# Patient Record
Sex: MALE | Race: WHITE | URBAN - METROPOLITAN AREA
[De-identification: names, ages, dates, MRNs, and addresses within clinical notes are randomized per-mention and may not be internally consistent; named-entity substitution may affect disease eponyms.]

---

## 2021-01-09 PROBLEM — F51.01 PRIMARY INSOMNIA: Status: ACTIVE | Noted: 2019-06-21

## 2021-01-09 PROBLEM — G47.33 OSA (OBSTRUCTIVE SLEEP APNEA): Status: ACTIVE | Noted: 2019-07-09

## 2021-01-09 PROBLEM — J30.1 SEASONAL ALLERGIC RHINITIS DUE TO POLLEN: Status: ACTIVE | Noted: 2019-06-21

## 2023-10-02 ASSESSMENT — ENCOUNTER SYMPTOMS
COLOR CHANGE: 0
SHORTNESS OF BREATH: 0
VOMITING: 0
WHEEZING: 0
RHINORRHEA: 0
COUGH: 0
ABDOMINAL PAIN: 0
DIARRHEA: 0
CONSTIPATION: 0
NAUSEA: 0
CHEST TIGHTNESS: 0
BACK PAIN: 0

## 2023-10-03 ENCOUNTER — OFFICE VISIT (OUTPATIENT)
Dept: FAMILY MEDICINE CLINIC | Age: 48
End: 2023-10-03
Payer: COMMERCIAL

## 2023-10-03 VITALS
SYSTOLIC BLOOD PRESSURE: 124 MMHG | DIASTOLIC BLOOD PRESSURE: 72 MMHG | HEART RATE: 74 BPM | BODY MASS INDEX: 27.96 KG/M2 | WEIGHT: 211 LBS | OXYGEN SATURATION: 98 % | HEIGHT: 73 IN

## 2023-10-03 DIAGNOSIS — Z00.00 ANNUAL PHYSICAL EXAM: Primary | ICD-10-CM

## 2023-10-03 DIAGNOSIS — Z76.89 ENCOUNTER TO ESTABLISH CARE: ICD-10-CM

## 2023-10-03 DIAGNOSIS — Z12.11 SCREENING FOR COLON CANCER: ICD-10-CM

## 2023-10-03 DIAGNOSIS — R31.9 HEMATURIA, UNSPECIFIED TYPE: ICD-10-CM

## 2023-10-03 LAB
BILIRUBIN, POC: NORMAL
BLOOD URINE, POC: NORMAL
CLARITY, POC: NORMAL
COLOR, POC: YELLOW
GLUCOSE URINE, POC: NORMAL
KETONES, POC: NORMAL
LEUKOCYTE EST, POC: NORMAL
NITRITE, POC: NORMAL
PH, POC: 6
PROTEIN, POC: NORMAL
SPECIFIC GRAVITY, POC: 1.02
UROBILINOGEN, POC: 0.2

## 2023-10-03 PROCEDURE — 99386 PREV VISIT NEW AGE 40-64: CPT | Performed by: CLINICAL NURSE SPECIALIST

## 2023-10-03 PROCEDURE — 81002 URINALYSIS NONAUTO W/O SCOPE: CPT | Performed by: CLINICAL NURSE SPECIALIST

## 2023-10-03 ASSESSMENT — PATIENT HEALTH QUESTIONNAIRE - PHQ9
SUM OF ALL RESPONSES TO PHQ QUESTIONS 1-9: 0
SUM OF ALL RESPONSES TO PHQ9 QUESTIONS 1 & 2: 0
2. FEELING DOWN, DEPRESSED OR HOPELESS: 0
SUM OF ALL RESPONSES TO PHQ QUESTIONS 1-9: 0
SUM OF ALL RESPONSES TO PHQ QUESTIONS 1-9: 0
1. LITTLE INTEREST OR PLEASURE IN DOING THINGS: 0
SUM OF ALL RESPONSES TO PHQ QUESTIONS 1-9: 0

## 2023-10-03 NOTE — PROGRESS NOTES
Discharge Summary/Instructions after an Endoscopic Procedure  Patient Name: Alon Adamson  Patient MRN: [id number]  Patient YOB: 1954  Thursday, January 25, 2018  Anju Myles MD  RESTRICTIONS:  During your procedure today, you received medications for sedation.  These   medications may affect your judgment, balance and coordination.  Therefore,   for 24 hours, you have the following restrictions:   - DO NOT drive a car, operate machinery, make legal/financial decisions,   sign important papers or drink alcohol.    ACTIVITY:  The following day: return to full activity including work, except no heavy   lifting, straining or running for 3 days if polyps were removed.  DIET:  Eat and drink normally unless instructed otherwise.     TREATMENT FOR COMMON SIDE EFFECTS:  - Mild abdominal pain, belching, bloating or excessive gas: rest, eat   lightly and use a heating pad.  - Sore Throat: treat with throat lozenges and/or gargle with warm salt   water.  SYMPTOMS TO WATCH FOR AND REPORT TO YOUR PHYSICIAN:  1. Abdominal pain or bloating, other than gas cramps.  2. Chest pain.  3. Back pain.  4. Chills or fever occurring within 24 hours after the procedure.  5. Rectal bleeding, which would show as bright red, maroon, or black stools.   (A tablespoon of blood from the rectum is not serious, especially if   hemorrhoids are present.)  6. Vomiting.  7. Weakness or dizziness.  8. Because air was used during the procedure, expelling large amounts of air   from your rectum or belching is normal.  9. If a bowel prep was taken, you may not have a bowel movement for 1-3   days.  This is normal.  GO DIRECTLY TO THE NEAREST EMERGENCY ROOM IF YOU HAVE ANY OF THE FOLLOWING:      Difficulty breathing  Chills and/or fever over 101 F   Persistent vomiting and/or vomiting blood   Severe abdominal pain   Severe chest pain   Black, tarry stools   Bleeding- more than one tablespoon   Any other symptom or condition that you may feel  SUBJECTIVE:    Patient ID:  Mary Martinez is a 57 y.o. male      Patient is here to establish care and for a complete physical.  He has no questions or concerns regarding their health. Patient's allergies, medication, medical, surgical, family and social history were reviewed and updated accordingly. He was recently seen by dermatologist and had a spot removed, which was noncancerous. He is also followed by GI for esophageal strictures and and had esophageal dilation about 3 years ago. He also has a history of central apnea and uses a CPAP machine nightly. 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 Discussed colon cancer screening, referral given to gastroenterology. Reviewed from 9/19/2023 CBC unremarkable, CMP essentially, total cholesterol 148, triglycerides 93, HDL 45, LDL 86, A1c 5.2. States he had his flu vaccine earlier this week at work. Current Outpatient Medications on File Prior to Visit   Medication Sig Dispense Refill    levocetirizine (XYZAL) 5 MG tablet Take 1 tablet by mouth nightly. 30 tablet 1     No current facility-administered medications on file prior to visit. Past Medical History:   Diagnosis Date    Seasonal allergies      History reviewed. No pertinent surgical history.   Family History   Problem Relation Age of Onset    High Cholesterol Father     Heart Attack Father     Heart Disease Father     Pancreatic Cancer Maternal Grandmother     Bone Cancer Maternal Grandfather     Amyotrophic lateral sclerosis Paternal Grandmother      Social History     Socioeconomic History    Marital status: Single     Spouse name: Not on file    Number needs urgent attention  Your doctor recommends these additional instructions:  If any biopsies were taken, your doctor s clinic will contact you in 1 to 2   weeks with any results.  Resume your previous diet.   Continue your present medications.   Return to your liver clinic as previously scheduled.   The findings and recommendations have been discussed with you.  For questions, problems or results please call your physician - Anju Myles MD at Work:  [phone].  [facility], EMERGENCY ROOM PHONE NUMBER: [phone]  IF A COMPLICATION OR EMERGENCY SITUATION ARISES AND YOU ARE UNABLE TO REACH   YOUR PHYSICIAN - GO DIRECTLY TO THE EMERGENCY ROOM.  Anju Myles MD  1/25/2018 12:39:08 PM  This report has been verified and signed electronically.

## 2023-10-03 NOTE — PATIENT INSTRUCTIONS
Signed appropriate paperwork to have records sent to the office    Fasting labs ordered    Discussed colon cancer screening, referral to gastroenterology given    Follow-up as needed/directed for acute issues/chronic conditions

## 2023-10-05 LAB — BACTERIA UR CULT: NORMAL

## 2024-02-20 ENCOUNTER — COMMUNITY OUTREACH (OUTPATIENT)
Dept: FAMILY MEDICINE CLINIC | Age: 49
End: 2024-02-20

## 2024-12-16 ENCOUNTER — OFFICE VISIT (OUTPATIENT)
Dept: FAMILY MEDICINE CLINIC | Age: 49
End: 2024-12-16

## 2024-12-16 ENCOUNTER — HOSPITAL ENCOUNTER (OUTPATIENT)
Dept: CT IMAGING | Age: 49
Discharge: HOME | End: 2024-12-16
Payer: COMMERCIAL

## 2024-12-16 VITALS
OXYGEN SATURATION: 99 % | BODY MASS INDEX: 27.3 KG/M2 | SYSTOLIC BLOOD PRESSURE: 112 MMHG | HEIGHT: 73 IN | HEART RATE: 85 BPM | WEIGHT: 206 LBS | DIASTOLIC BLOOD PRESSURE: 68 MMHG

## 2024-12-16 DIAGNOSIS — R51.9 NEW ONSET HEADACHE: ICD-10-CM

## 2024-12-16 DIAGNOSIS — R51.9 WORST HEADACHE OF LIFE: Primary | ICD-10-CM

## 2024-12-16 DIAGNOSIS — G44.82 HEADACHE ASSOCIATED WITH ORGASM: ICD-10-CM

## 2024-12-16 DIAGNOSIS — R31.9 HEMATURIA, UNSPECIFIED TYPE: ICD-10-CM

## 2024-12-16 DIAGNOSIS — Z13.220 SCREENING FOR CHOLESTEROL LEVEL: ICD-10-CM

## 2024-12-16 DIAGNOSIS — Z12.11 SCREENING FOR COLON CANCER: ICD-10-CM

## 2024-12-16 DIAGNOSIS — J02.9 SORE THROAT: ICD-10-CM

## 2024-12-16 DIAGNOSIS — R51.9 WORST HEADACHE OF LIFE: ICD-10-CM

## 2024-12-16 DIAGNOSIS — Z20.818 EXPOSURE TO STREP THROAT: ICD-10-CM

## 2024-12-16 DIAGNOSIS — J06.9 ACUTE URI: ICD-10-CM

## 2024-12-16 LAB
BILIRUBIN, POC: NORMAL
BLOOD URINE, POC: NORMAL
CLARITY, POC: CLEAR
COLOR, POC: YELLOW
GLUCOSE URINE, POC: NORMAL MG/DL
INFLUENZA A ANTIBODY: NORMAL
INFLUENZA B ANTIBODY: NORMAL
KETONES, POC: 15 MG/DL
LEUKOCYTE EST, POC: NORMAL
NITRITE, POC: NORMAL
PH, POC: 6
PROTEIN, POC: 30 MG/DL
S PYO AG THROAT QL: NORMAL
SPECIFIC GRAVITY, POC: 1.02
UROBILINOGEN, POC: 1 MG/DL

## 2024-12-16 PROCEDURE — 70450 CT HEAD/BRAIN W/O DYE: CPT

## 2024-12-16 SDOH — ECONOMIC STABILITY: FOOD INSECURITY: WITHIN THE PAST 12 MONTHS, THE FOOD YOU BOUGHT JUST DIDN'T LAST AND YOU DIDN'T HAVE MONEY TO GET MORE.: NEVER TRUE

## 2024-12-16 SDOH — ECONOMIC STABILITY: INCOME INSECURITY: HOW HARD IS IT FOR YOU TO PAY FOR THE VERY BASICS LIKE FOOD, HOUSING, MEDICAL CARE, AND HEATING?: NOT HARD AT ALL

## 2024-12-16 SDOH — ECONOMIC STABILITY: FOOD INSECURITY: WITHIN THE PAST 12 MONTHS, YOU WORRIED THAT YOUR FOOD WOULD RUN OUT BEFORE YOU GOT MONEY TO BUY MORE.: NEVER TRUE

## 2024-12-16 ASSESSMENT — PATIENT HEALTH QUESTIONNAIRE - PHQ9
SUM OF ALL RESPONSES TO PHQ9 QUESTIONS 1 & 2: 0
2. FEELING DOWN, DEPRESSED OR HOPELESS: NOT AT ALL
SUM OF ALL RESPONSES TO PHQ QUESTIONS 1-9: 0
1. LITTLE INTEREST OR PLEASURE IN DOING THINGS: NOT AT ALL
SUM OF ALL RESPONSES TO PHQ QUESTIONS 1-9: 0
SUM OF ALL RESPONSES TO PHQ QUESTIONS 1-9: 0
SUM OF ALL RESPONSES TO PHQ9 QUESTIONS 1 & 2: 0
SUM OF ALL RESPONSES TO PHQ QUESTIONS 1-9: 0
1. LITTLE INTEREST OR PLEASURE IN DOING THINGS: NOT AT ALL
2. FEELING DOWN, DEPRESSED OR HOPELESS: NOT AT ALL

## 2024-12-16 NOTE — PROGRESS NOTES
SUBJECTIVE:    Patient ID:  Clyde Alexis is a 49 y.o. male      Patient was originally scheduled for a complete physical.  He is concerned about URI symptoms, sore throat and headache associated with sexual activity.  Explained to patient that acute issues URI symptoms/sore throat and headache will be addressed today and he can reschedule for his complete physical.  Patient verbalizes understanding and is agreeable to treatment plan.      States his wife was recently diagnosed with strep and he has been experiencing a sore throat.  He also states he has been having headaches with sex.  States he has never had headaches like this and endorses this is the worst headache he has had.  States he has done some research online and is wanting to try propranolol.      Illness  Episode onset: 4 days. The problem occurs constantly. The problem has been gradually improving since onset. The pain is moderate. Associated symptoms include congestion, headaches, a sore throat, fatigue, a fever and coughing (occ prod). Pertinent negatives include no ear pain, eye discharge, rhinorrhea, swollen glands, chest pain, shortness of breath, wheezing, abdominal pain, constipation, diarrhea, nausea, vomiting, joint pain, muscle aches or rash. Treatments tried: Ibuprofen, cough drops. The treatment provided mild relief. The fever has been present for 1 to 2 days. Temperature source: IR. Cough associated with: occ productive. Cough characteristics: occ productive. There is Nasal congestion. The congestion Does not interfere with sleep. The congestion Does not interfere with eating or drinking. He has been experiencing a moderate sore throat.   Headache  Headache pattern:  Headache sometimes there, sometimes not at all  Initial event:  None  Recent changes:  No recent change in headache pattern  Frequency:  More than 3 per week  Providers seen:  None  Age of onset (years):  49  Longest time without a headache:  Minutes  Number of ER visits for

## 2024-12-16 NOTE — PATIENT INSTRUCTIONS
Discussed symptomatic treatment of URI symptoms    Strep and influenza were negative     Flonase 1-2 puffs to each nostril daily.    Delsym twice a day as needed for cough.    Cepacol Lozenges as needed for sore throat.      Tylenol and or Motrin as needed/directed for pain and fever.      Encourage rest and fluids.        CT of the head STAT    Ibuprofen 600 mg to 800 mg 2-3 times a day as needed for headache, take with food and full glass of water (after CT)     Keep a headache journal including, timing, associated symptoms, treatments and response to treatment    Trial of propranolol inderal 20 mg twice daily, if CT normal (orgasm related headache)     Discussed proper hydration and sleep for headache prevent

## 2024-12-17 RX ORDER — PROPRANOLOL HCL 20 MG
20 TABLET ORAL 2 TIMES DAILY
Qty: 60 TABLET | Refills: 0 | Status: SHIPPED | OUTPATIENT
Start: 2024-12-17

## 2024-12-18 LAB — BACTERIA UR CULT: NORMAL

## 2025-01-13 ENCOUNTER — OFFICE VISIT (OUTPATIENT)
Dept: FAMILY MEDICINE CLINIC | Age: 50
End: 2025-01-13

## 2025-01-13 VITALS
HEIGHT: 73 IN | OXYGEN SATURATION: 97 % | SYSTOLIC BLOOD PRESSURE: 128 MMHG | BODY MASS INDEX: 27.57 KG/M2 | HEART RATE: 76 BPM | DIASTOLIC BLOOD PRESSURE: 74 MMHG | WEIGHT: 208 LBS

## 2025-01-13 DIAGNOSIS — R51.9 NEW ONSET HEADACHE: ICD-10-CM

## 2025-01-13 DIAGNOSIS — G44.82 HEADACHE ASSOCIATED WITH ORGASM: ICD-10-CM

## 2025-01-13 DIAGNOSIS — R13.10 DYSPHAGIA, UNSPECIFIED TYPE: ICD-10-CM

## 2025-01-13 DIAGNOSIS — G44.82 HEADACHE ASSOCIATED WITH ORGASM: Primary | ICD-10-CM

## 2025-01-13 RX ORDER — PROPRANOLOL HCL 20 MG
20 TABLET ORAL 2 TIMES DAILY
Qty: 60 TABLET | Refills: 0 | OUTPATIENT
Start: 2025-01-13

## 2025-01-13 RX ORDER — PROPRANOLOL HCL 20 MG
20 TABLET ORAL 2 TIMES DAILY
Qty: 180 TABLET | Refills: 0 | Status: SHIPPED | OUTPATIENT
Start: 2025-01-13

## 2025-01-13 SDOH — ECONOMIC STABILITY: FOOD INSECURITY: WITHIN THE PAST 12 MONTHS, THE FOOD YOU BOUGHT JUST DIDN'T LAST AND YOU DIDN'T HAVE MONEY TO GET MORE.: NEVER TRUE

## 2025-01-13 SDOH — ECONOMIC STABILITY: INCOME INSECURITY: IN THE LAST 12 MONTHS, WAS THERE A TIME WHEN YOU WERE NOT ABLE TO PAY THE MORTGAGE OR RENT ON TIME?: NO

## 2025-01-13 SDOH — ECONOMIC STABILITY: FOOD INSECURITY: WITHIN THE PAST 12 MONTHS, YOU WORRIED THAT YOUR FOOD WOULD RUN OUT BEFORE YOU GOT MONEY TO BUY MORE.: NEVER TRUE

## 2025-01-13 SDOH — ECONOMIC STABILITY: TRANSPORTATION INSECURITY
IN THE PAST 12 MONTHS, HAS LACK OF TRANSPORTATION KEPT YOU FROM MEETINGS, WORK, OR FROM GETTING THINGS NEEDED FOR DAILY LIVING?: NO

## 2025-01-13 SDOH — ECONOMIC STABILITY: TRANSPORTATION INSECURITY
IN THE PAST 12 MONTHS, HAS THE LACK OF TRANSPORTATION KEPT YOU FROM MEDICAL APPOINTMENTS OR FROM GETTING MEDICATIONS?: NO

## 2025-01-13 ASSESSMENT — PATIENT HEALTH QUESTIONNAIRE - PHQ9
SUM OF ALL RESPONSES TO PHQ QUESTIONS 1-9: 0
SUM OF ALL RESPONSES TO PHQ QUESTIONS 1-9: 0
2. FEELING DOWN, DEPRESSED OR HOPELESS: NOT AT ALL
SUM OF ALL RESPONSES TO PHQ QUESTIONS 1-9: 0
2. FEELING DOWN, DEPRESSED OR HOPELESS: NOT AT ALL
SUM OF ALL RESPONSES TO PHQ9 QUESTIONS 1 & 2: 0
1. LITTLE INTEREST OR PLEASURE IN DOING THINGS: NOT AT ALL
1. LITTLE INTEREST OR PLEASURE IN DOING THINGS: NOT AT ALL
SUM OF ALL RESPONSES TO PHQ QUESTIONS 1-9: 0
SUM OF ALL RESPONSES TO PHQ9 QUESTIONS 1 & 2: 0

## 2025-01-13 NOTE — PROGRESS NOTES
SUBJECTIVE:    Patient ID:  Clyde Alexis is a 49 y.o. male      Patient is here for a 1 month follow up for new onset headache and headache with orgasm.  He was started on propanolol 20 mg twice daily.      Patient was originally scheduled for a complete physical.  He is concerned about URI symptoms, sore throat and headache associated with sexual activity.  Explained to patient that acute issues URI symptoms/sore throat and headache will be addressed today and he can reschedule for his complete physical.  Patient verbalizes understanding and is agreeable to treatment plan.       States his wife was recently diagnosed with strep and he has been experiencing a sore throat.  He also states he has been having headaches with sex.  States he has never had headaches like this and endorses this is the worst headache he has had.  States he has done some research online and is wanting to try propranolol.      States heads have resolved with propanolol 20 mg twice a day.      States he is having intermittent difficulty swallowing.  Discussed and offered a trial of PPI's or referral to gastroenterology, patent declines at this time.  States he would like to continue to monitor until next office visit.          Current Outpatient Medications on File Prior to Visit   Medication Sig Dispense Refill    levocetirizine (XYZAL) 5 MG tablet Take 1 tablet by mouth nightly. 30 tablet 1     No current facility-administered medications on file prior to visit.      Past Medical History:   Diagnosis Date    Seasonal allergies      No past surgical history on file.  Family History   Problem Relation Age of Onset    High Cholesterol Father     Heart Attack Father     Heart Disease Father     Pancreatic Cancer Maternal Grandmother     Bone Cancer Maternal Grandfather     Amyotrophic lateral sclerosis Paternal Grandmother      Social History     Socioeconomic History    Marital status:      Spouse name: Not on file    Number of children:

## 2025-01-13 NOTE — TELEPHONE ENCOUNTER
Medication:   Requested Prescriptions     Pending Prescriptions Disp Refills    propranolol (INDERAL) 20 MG tablet [Pharmacy Med Name: PROPRANOLOL 20 MG TABLET] 60 tablet 0     Sig: TAKE 1 TABLET BY MOUTH 2 TIMES A DAY     Last Filled:  12/17/24    Last appt: 12/16/2024   Next appt: 1/13/2025    Last OARRS:        No data to display

## 2025-01-13 NOTE — PATIENT INSTRUCTIONS
Ibuprofen 600 mg to 800 mg 2-3 times a day as needed for headache, take with food and full glass of water (after CT)      Keep a headache journal including, timing, associated symptoms, treatments and response to treatment     Continue propranolol inderal 20 mg twice daily      Discussed proper hydration and sleep for headache prevent     Follow up in 3 months, sooner is symptoms worsen or persist     [facility] Locations - No appointment necessary.  ? indicates the location is open Saturdays in addition to Monday through Friday.   Call your preferred location for test preparation, business hours and other information you need.   [facility] accepts all insurances.  [place]  [place]  [place]    ? [place]   [address]   [address]   [place] [address]    Ph: [phone]  [facility]   [address] [place] [address]    Ph: [phone]   ? [place]   [address]    [place] [address]    Ph: [phone]     [facility]   [address] [place] [address]    Ph: [phone] ? [place]   [address] [place] [address]   Ph: [phone]  ? [facility]   [address] [place] [address]    Ph: [phone]      [place]   [address] [place] [address]   Ph: [phone]     [facility]  [address] [place] [address]   Ph: [phone]    [place]    ? [facility]   [address] [place] [address]    Ph: [phone]  [facility]   [address] [place] [address]   Ph: [phone]  [place]   [address]   [facility], [address]    PH: [phone]    [facility]   [address] [place] [address]    Ph: [phone]  [place]  [address] [place] [address]  Ph: [phone]  [facility]   [address] [place] [address]    Ph: [phone]

## 2025-02-08 DIAGNOSIS — G44.82 HEADACHE ASSOCIATED WITH ORGASM: ICD-10-CM

## 2025-02-10 RX ORDER — PROPRANOLOL HCL 20 MG
20 TABLET ORAL 2 TIMES DAILY
Qty: 60 TABLET | OUTPATIENT
Start: 2025-02-10

## 2025-02-18 LAB — NONINV COLON CA DNA+OCC BLD SCRN STL QL: NEGATIVE

## 2025-04-05 DIAGNOSIS — R51.9 NEW ONSET HEADACHE: ICD-10-CM

## 2025-04-05 DIAGNOSIS — Z13.220 SCREENING FOR CHOLESTEROL LEVEL: ICD-10-CM

## 2025-04-05 LAB
ALBUMIN SERPL-MCNC: 4.6 G/DL (ref 3.4–5)
ALBUMIN/GLOB SERPL: 2.3 {RATIO} (ref 1.1–2.2)
ALP SERPL-CCNC: 63 U/L (ref 40–129)
ALT SERPL-CCNC: 47 U/L (ref 10–40)
ANION GAP SERPL CALCULATED.3IONS-SCNC: 11 MMOL/L (ref 3–16)
AST SERPL-CCNC: 30 U/L (ref 15–37)
BASOPHILS # BLD: 0 K/UL (ref 0–0.2)
BASOPHILS NFR BLD: 0.4 %
BILIRUB SERPL-MCNC: 0.9 MG/DL (ref 0–1)
BUN SERPL-MCNC: 13 MG/DL (ref 7–20)
CALCIUM SERPL-MCNC: 9.2 MG/DL (ref 8.3–10.6)
CHLORIDE SERPL-SCNC: 104 MMOL/L (ref 99–110)
CHOLEST SERPL-MCNC: 147 MG/DL (ref 0–199)
CO2 SERPL-SCNC: 25 MMOL/L (ref 21–32)
CREAT SERPL-MCNC: 1 MG/DL (ref 0.9–1.3)
DEPRECATED RDW RBC AUTO: 12.7 % (ref 12.4–15.4)
EOSINOPHIL # BLD: 0.2 K/UL (ref 0–0.6)
EOSINOPHIL NFR BLD: 1.9 %
GFR SERPLBLD CREATININE-BSD FMLA CKD-EPI: >90 ML/MIN/{1.73_M2}
GLUCOSE SERPL-MCNC: 98 MG/DL (ref 70–99)
HCT VFR BLD AUTO: 45.5 % (ref 40.5–52.5)
HDLC SERPL-MCNC: 43 MG/DL (ref 40–60)
HGB BLD-MCNC: 15.9 G/DL (ref 13.5–17.5)
LDLC SERPL CALC-MCNC: 89 MG/DL
LYMPHOCYTES # BLD: 1.6 K/UL (ref 1–5.1)
LYMPHOCYTES NFR BLD: 18.5 %
MCH RBC QN AUTO: 33.1 PG (ref 26–34)
MCHC RBC AUTO-ENTMCNC: 34.9 G/DL (ref 31–36)
MCV RBC AUTO: 94.7 FL (ref 80–100)
MONOCYTES # BLD: 0.6 K/UL (ref 0–1.3)
MONOCYTES NFR BLD: 7 %
NEUTROPHILS # BLD: 6.1 K/UL (ref 1.7–7.7)
NEUTROPHILS NFR BLD: 72.2 %
PLATELET # BLD AUTO: 207 K/UL (ref 135–450)
PMV BLD AUTO: 8.6 FL (ref 5–10.5)
POTASSIUM SERPL-SCNC: 4.4 MMOL/L (ref 3.5–5.1)
PROT SERPL-MCNC: 6.6 G/DL (ref 6.4–8.2)
RBC # BLD AUTO: 4.81 M/UL (ref 4.2–5.9)
SODIUM SERPL-SCNC: 140 MMOL/L (ref 136–145)
TRIGL SERPL-MCNC: 76 MG/DL (ref 0–150)
TSH SERPL DL<=0.005 MIU/L-ACNC: 1.19 UIU/ML (ref 0.27–4.2)
VLDLC SERPL CALC-MCNC: 15 MG/DL
WBC # BLD AUTO: 8.5 K/UL (ref 4–11)

## 2025-04-06 NOTE — PATIENT INSTRUCTIONS
Ibuprofen 600 mg to 800 mg 2-3 times a day as needed for headache, take with food and full glass of water (after CT)      Keep a headache journal including, timing, associated symptoms, treatments and response to treatment     Hold propranolol inderal 20 mg twice daily      Discussed proper hydration and sleep for headache prevent      Follow up in 6-12 months, sooner is symptoms worsen or persist

## 2025-04-06 NOTE — PROGRESS NOTES
physical, headache, hematuria, KARTHIK.    Clyde received counseling on the following healthy behaviors: nutrition, exercise, and medication adherence    Patient given educational materials on health maintenance    Discussed use, benefit, and side effects of prescribed medications.  Barriers to medication compliance addressed.  All patient questions answered.  Pt voiced understanding.     Call office if experience side effects from medications.      Please note that some or all of this record was generated using voice recognition software. If there are any questions about the content of this document, please contact the author as some errors in transcription may have occurred.

## 2025-04-07 ENCOUNTER — RESULTS FOLLOW-UP (OUTPATIENT)
Dept: FAMILY MEDICINE CLINIC | Age: 50
End: 2025-04-07

## 2025-04-07 ENCOUNTER — OFFICE VISIT (OUTPATIENT)
Dept: FAMILY MEDICINE CLINIC | Age: 50
End: 2025-04-07
Payer: COMMERCIAL

## 2025-04-07 VITALS — HEART RATE: 84 BPM | OXYGEN SATURATION: 98 % | SYSTOLIC BLOOD PRESSURE: 120 MMHG | DIASTOLIC BLOOD PRESSURE: 74 MMHG

## 2025-04-07 DIAGNOSIS — R31.9 HEMATURIA, UNSPECIFIED TYPE: ICD-10-CM

## 2025-04-07 DIAGNOSIS — G47.33 OSA (OBSTRUCTIVE SLEEP APNEA): ICD-10-CM

## 2025-04-07 DIAGNOSIS — Z00.00 ANNUAL PHYSICAL EXAM: Primary | ICD-10-CM

## 2025-04-07 DIAGNOSIS — G44.82 HEADACHE ASSOCIATED WITH ORGASM: ICD-10-CM

## 2025-04-07 LAB
BILIRUBIN, POC: NORMAL
BLOOD URINE, POC: NORMAL
CLARITY, POC: NORMAL
COLOR, POC: NORMAL
GLUCOSE URINE, POC: NORMAL MG/DL
KETONES, POC: NORMAL MG/DL
LEUKOCYTE EST, POC: NORMAL
NITRITE, POC: NORMAL
PH, POC: 6.5
PROTEIN, POC: NORMAL MG/DL
SPECIFIC GRAVITY, POC: 1.02
UROBILINOGEN, POC: 0.2 MG/DL

## 2025-04-07 PROCEDURE — 81002 URINALYSIS NONAUTO W/O SCOPE: CPT | Performed by: CLINICAL NURSE SPECIALIST

## 2025-04-07 PROCEDURE — 99396 PREV VISIT EST AGE 40-64: CPT | Performed by: CLINICAL NURSE SPECIALIST

## 2025-04-07 RX ORDER — PROPRANOLOL HCL 20 MG
20 TABLET ORAL 2 TIMES DAILY
Qty: 180 TABLET | Refills: 0 | Status: CANCELLED | OUTPATIENT
Start: 2025-04-07

## 2025-04-07 ASSESSMENT — PATIENT HEALTH QUESTIONNAIRE - PHQ9
SUM OF ALL RESPONSES TO PHQ QUESTIONS 1-9: 0
SUM OF ALL RESPONSES TO PHQ QUESTIONS 1-9: 0
1. LITTLE INTEREST OR PLEASURE IN DOING THINGS: NOT AT ALL
SUM OF ALL RESPONSES TO PHQ QUESTIONS 1-9: 0
2. FEELING DOWN, DEPRESSED OR HOPELESS: NOT AT ALL
SUM OF ALL RESPONSES TO PHQ QUESTIONS 1-9: 0

## 2025-04-07 ASSESSMENT — ENCOUNTER SYMPTOMS
RHINORRHEA: 0
BACK PAIN: 0
COLOR CHANGE: 0